# Patient Record
Sex: MALE | Race: WHITE | NOT HISPANIC OR LATINO
[De-identification: names, ages, dates, MRNs, and addresses within clinical notes are randomized per-mention and may not be internally consistent; named-entity substitution may affect disease eponyms.]

---

## 2017-01-10 ENCOUNTER — TRANSCRIPTION ENCOUNTER (OUTPATIENT)
Age: 67
End: 2017-01-10

## 2017-10-04 ENCOUNTER — TRANSCRIPTION ENCOUNTER (OUTPATIENT)
Age: 67
End: 2017-10-04

## 2018-02-08 ENCOUNTER — TRANSCRIPTION ENCOUNTER (OUTPATIENT)
Age: 68
End: 2018-02-08

## 2018-05-09 ENCOUNTER — TRANSCRIPTION ENCOUNTER (OUTPATIENT)
Age: 68
End: 2018-05-09

## 2019-08-23 ENCOUNTER — APPOINTMENT (OUTPATIENT)
Dept: ORTHOPEDIC SURGERY | Facility: CLINIC | Age: 69
End: 2019-08-23
Payer: MEDICARE

## 2019-08-23 VITALS
BODY MASS INDEX: 28.88 KG/M2 | DIASTOLIC BLOOD PRESSURE: 80 MMHG | HEIGHT: 69 IN | SYSTOLIC BLOOD PRESSURE: 153 MMHG | HEART RATE: 69 BPM | WEIGHT: 195 LBS

## 2019-08-23 DIAGNOSIS — M70.71 OTHER BURSITIS OF HIP, RIGHT HIP: ICD-10-CM

## 2019-08-23 DIAGNOSIS — M23.91 UNSPECIFIED INTERNAL DERANGEMENT OF RIGHT KNEE: ICD-10-CM

## 2019-08-23 PROCEDURE — 73522 X-RAY EXAM HIPS BI 3-4 VIEWS: CPT

## 2019-08-23 PROCEDURE — 99204 OFFICE O/P NEW MOD 45 MIN: CPT | Mod: 25

## 2019-08-23 PROCEDURE — 73564 X-RAY EXAM KNEE 4 OR MORE: CPT | Mod: RT

## 2019-08-23 PROCEDURE — 20610 DRAIN/INJ JOINT/BURSA W/O US: CPT | Mod: RT

## 2019-08-23 NOTE — PROCEDURE
[de-identified] : Procedure Note:\par \par Anatomic Location:  Right  Knee\par \par Diagnosis:  Arthritis\par \par Procedure:  Injection of 2cc  of Marcaine 0.25% plain and Celestone 1cc, 6mg\par \par Local Spray: Ethyl Chloride.\par \par \par Patient has consented for the procedure.\par \par Injection  through a lateral parapatella approach.\par \par Patient tolerated the procedure well.\par \par Patient instructed to call the office if any reaction, fever, chills, increased erythema or swelling.   [phone].

## 2019-08-23 NOTE — PHYSICAL EXAM
[de-identified] : Constitutional - the patient is of normal build and nutrition.  The patient remains oriented to person, time, and  place.  Mood is normal. Vital signs as recorded.  The patients gait is WNL.  Does however have pain when walking and bending over the medial compartment of his right knee.  He says he has had problems in the past with his back and there was question whether or not he had bursitis around his right hip.  The patient has satisfactory  balance and can stand on toes and heels.\par \par The patient has no difficulty with respiration. Respiration at rest is a normal rate. The patient is not short of breath and has not become short of breath with short ambulation. There is no audible wheezing. No coughing.\par \par Skin is normal for the patient's age. There are no abnormal masses or lymph nodes which stand out in the lower extremities.\par \par Spine - deep tendon reflexes are symmetric\par \par \par UPPER EXTREMITIES \par \par Shoulders ROM  is symmetric  and the motion is satisfactory.  There is no significant shoulder pain or limitation in motion which would make using a cane or a walker difficult. Shoulder stability and  strength are satisfactory.\par \par Circulation appears satisfactory with pedal pulses present.  There is no major edema in the lower legs. No skin tenderness or increased temperature. No major varicosities.\par \par HIP EXAMINATION the abduction and abduction as well as rotation measurements were taken with the hip in flexion.\par \par Motion\par At this time the patient does move his slight hip slightly less than he is moving his left hip which may be as normal where he may be protecting his knee but he has flexion right hip 130 left 135, abduction right hip 70 left hip 85, adduction right hip 30 left hip 30, external rotation right hip of 50 left hip 75, internal rotation right hip 20 left hip 20.  He does have no major discomfort with motion in his hip his discomfort is more over the medial compartment of his knee.  There is no crepitus in either hip and his hips are aligned normally.\par \par Is not severe and he says he would prefer not having a shot if not necessary as far as his hip.\par \par KNEE EXAMINATION\par \par Motion\par Right Knee                 *[0-125] he has good medial lateral and anterior posterior stability.  He does have an effusion in his knee but no Baker's cyst.  He has positive medial Steinmann test and pain directly over his medial joint line.  He does not have a major amount of pain with compression of his patella it appears to be more medial and it was acute.\par Left  Knee                  *[0-135] is good medial lateral and anterior posterior stability he has no effusion he has no major crepitus side he has no pain over the medial or lateral joint line.\par \par Ankle and foot examination\par Of the ankle has normal motion.  There is normal ankle stability.  The patient has no major abnormalities of the foot.\par \par \par \par  [de-identified] : Of 4 views of the right knee and AP of the left show normal joint space medially the patella tracks well there is no evidence of any significant arthritis by x-ray this does not rule out meniscal tears.  X-rays taken of the patient's pelvis and well as right and left hips show femoral heads are round joint space are maintained bilaterally there is no significant arthritis no major calcification over the greater trochanters this does not rule out some mild trochanteric bursitis.

## 2019-08-23 NOTE — HISTORY OF PRESENT ILLNESS
[de-identified] : Mr. JUDY BISHOP is a 68 year male who presents to office complaining of acute right knee pain.\par Patient was at the [employer] game last night and was running down the stairs during a rain delay, didn't feel pain initially, but awoke this morning with a sharp pain located medially.\miles Has h/o DDD in lumbar spine which had been affecting his right hip and taking Celebrex for this.\par Pain is constant and nonradiating.\par Level of pain on scale of 1-10 is 5 out of 10 at rest and 10/10 with ambulation.\par Denies buckling, locking, numbness, tingling, etc.\par All review of systems not previously stated as positive are negative.

## 2019-08-23 NOTE — DISCUSSION/SUMMARY
[de-identified] : Time he is doing satisfactory as far as his hips his right knee does give him the pain he tolerated a local injection very well and his pain was diminished if his pain does persist he is given a prescription for an MRI to better evaluate him for internal derangement or meniscal tear.  Return visit in 3 months or sooner if necessary.

## 2019-09-04 ENCOUNTER — FORM ENCOUNTER (OUTPATIENT)
Age: 69
End: 2019-09-04

## 2019-09-05 ENCOUNTER — APPOINTMENT (OUTPATIENT)
Dept: MRI IMAGING | Facility: CLINIC | Age: 69
End: 2019-09-05
Payer: MEDICARE

## 2019-09-05 ENCOUNTER — OUTPATIENT (OUTPATIENT)
Dept: OUTPATIENT SERVICES | Facility: HOSPITAL | Age: 69
LOS: 1 days | End: 2019-09-05
Payer: MEDICARE

## 2019-09-05 DIAGNOSIS — Z00.8 ENCOUNTER FOR OTHER GENERAL EXAMINATION: ICD-10-CM

## 2019-09-05 PROCEDURE — 73721 MRI JNT OF LWR EXTRE W/O DYE: CPT | Mod: 26,RT

## 2019-09-05 PROCEDURE — 73721 MRI JNT OF LWR EXTRE W/O DYE: CPT

## 2019-09-06 ENCOUNTER — TRANSCRIPTION ENCOUNTER (OUTPATIENT)
Age: 69
End: 2019-09-06

## 2019-09-11 ENCOUNTER — APPOINTMENT (OUTPATIENT)
Dept: ORTHOPEDIC SURGERY | Facility: CLINIC | Age: 69
End: 2019-09-11
Payer: MEDICARE

## 2019-09-11 VITALS
HEIGHT: 69 IN | HEART RATE: 72 BPM | SYSTOLIC BLOOD PRESSURE: 148 MMHG | WEIGHT: 195 LBS | DIASTOLIC BLOOD PRESSURE: 91 MMHG | BODY MASS INDEX: 28.88 KG/M2

## 2019-09-11 DIAGNOSIS — S83.249A OTHER TEAR OF MEDIAL MENISCUS, CURRENT INJURY, UNSPECIFIED KNEE, INITIAL ENCOUNTER: ICD-10-CM

## 2019-09-11 PROCEDURE — 99213 OFFICE O/P EST LOW 20 MIN: CPT

## 2019-09-11 NOTE — PHYSICAL EXAM
[de-identified] : \par KNEE EXAMINATION\par \par Motion\par Right Knee      still has a range of motion going from 0 to 125 degrees a good medial lateral knee posterior stability.  He has pain along the medial joint line and pain at the medial tibial plateau but it is much less than half of what it was 2 weeks ago at this time there is a minimal effusion in his knee and no Baker's cyst.  He is discomfort is steadily improving.  He has had no locking or giving way.\par \par            [de-identified] : Patient had and did show new acute medial meniscal tear around the periphery and posterior portion of the medial meniscus and a bone contusion of the medial tibial plateau which was mild.

## 2019-09-11 NOTE — DISCUSSION/SUMMARY
[de-identified] : Patient has a medial meniscal tear as well as a contusion of his medial tibial plateau very peripherally.  He is given a light open patella hinged knee support which he felt may give him a little more stability when he walks or a feeling of security.  He will stay on conservative measures at this time he is steadily improving he will see 1 of our sport orthopedist in 1 month if he is still having any significant problem.

## 2019-09-11 NOTE — HISTORY OF PRESENT ILLNESS
[de-identified] : Patient returns with right knee pain.\par His pain is slightly improved following cortisone injection in office on 8/23/19, but still has notable pain in his knee.\par He returns with MRI results of right knee DOS 9/5/19 showing tear of the body/posterior horn of the medial meniscus with focal edema in the adjacent medial tibial plateau. Focal full-thickness chondral fissuring within the superior pole of the median ridge with subchondral edema. Small joint effusion and small multiloculated Baker's cyst. \par He would like to discuss his options such as prescribed knee brace and/or PT vs. arthroscopic surgery, at this point.

## 2019-11-05 ENCOUNTER — APPOINTMENT (OUTPATIENT)
Dept: ORTHOPEDIC SURGERY | Facility: CLINIC | Age: 69
End: 2019-11-05
Payer: MEDICARE

## 2019-11-05 DIAGNOSIS — S83.241S OTHER TEAR OF MEDIAL MENISCUS, CURRENT INJURY, RIGHT KNEE, SEQUELA: ICD-10-CM

## 2019-11-05 PROCEDURE — 99213 OFFICE O/P EST LOW 20 MIN: CPT

## 2019-11-12 NOTE — DISCUSSION/SUMMARY
[Surgical risks reviewed] : Surgical risks reviewed [de-identified] : 68 y/o male presents with right knee pain due to meniscus tear. This time this patient does have a symptomatic tear of his medial meniscus. A lengthy discussion was held regarding the patients condition and treatment options including all risks, benefits, prognosis and outcomes of each were discussed in detail. The patient would like to continue to proceed with surgery since he has failed conservative therapy.  The patient will contact my surgical scheduler if he wished to proceed. The patient expressed understanding and all questions were answered. \par \par \par

## 2019-11-12 NOTE — PHYSICAL EXAM
[de-identified] : X-rays were reviewed by me today. 4 vies of the right knee dated on \par \par MRI of the right knee dated on 9/5/2019 reveals a tear of the  posterior aspect of his medial meniscus with minimal arthrotic chNGED..MM Full report of his MRI is given in the chart.8/23/19 Reveals minimal arthritic changes [de-identified] : 68 y/o pleasant male in NAD and AAOx3. Normal BMI. The pt has a mild antalgic gait. Physical examination of both knees reveals normal contours, skin intact with no signs of infection, no erythema, no swelling, no effusion, no distal lymphedema or phlebitis, no patholaxity. ROM of the right knee reveals 0-125 degrees. Strength is 5/5 within this arc of motion. There is pain on palpation to the medial joint line. There are no neurological deficits.

## 2019-11-12 NOTE — PHYSICAL EXAM
[de-identified] : 68 y/o pleasant male in NAD and AAOx3. Normal BMI. The pt has a mild antalgic gait. Physical examination of both knees reveals normal contours, skin intact with no signs of infection, no erythema, no swelling, no effusion, no distal lymphedema or phlebitis, no patholaxity. ROM of the right knee reveals 0-125 degrees. Strength is 5/5 within this arc of motion. There is pain on palpation to the medial joint line. There are no neurological deficits.  [de-identified] : X-rays were reviewed by me today. 4 vies of the right knee dated on \par \par MRI of the right knee dated on 9/5/2019 reveals a tear of the  posterior aspect of his medial meniscus with minimal arthrotic chNGED..MM Full report of his MRI is given in the chart.8/23/19 Reveals minimal arthritic changes

## 2019-11-12 NOTE — REASON FOR VISIT
[Follow-Up Visit] : a follow-up visit for [Initial Visit] : an initial visit for [FreeTextEntry2] : Right knee pain

## 2019-11-12 NOTE — DISCUSSION/SUMMARY
[Surgical risks reviewed] : Surgical risks reviewed [de-identified] : 68 y/o male presents with right knee pain due to meniscus tear. This time this patient does have a symptomatic tear of his medial meniscus. A lengthy discussion was held regarding the patients condition and treatment options including all risks, benefits, prognosis and outcomes of each were discussed in detail. The patient would like to continue to proceed with surgery since he has failed conservative therapy.  The patient will contact my surgical scheduler if he wished to proceed. The patient expressed understanding and all questions were answered. \par \par \par

## 2019-11-12 NOTE — HISTORY OF PRESENT ILLNESS
[de-identified] : 70 y/o male who was seen by Dr. Whitaker for right knee pain secondary to meniscus tear presents for consultation. He recently obtained and MRI and the patient is here for consultation. His knee pain has gotten worse over the past week require him to go to Urgent care and was treated with a medrol dose pack. He has tried cortisone injection which has not helped with his symptoms. \par

## 2019-11-12 NOTE — HISTORY OF PRESENT ILLNESS
[de-identified] : 70 y/o male who was seen by Dr. Whitaker for right knee pain secondary to meniscus tear presents for consultation. He recently obtained and MRI and the patient is here for consultation. His knee pain has gotten worse over the past week require him to go to Urgent care and was treated with a medrol dose pack. He has tried cortisone injection which has not helped with his symptoms. \par

## 2019-12-09 ENCOUNTER — OUTPATIENT (OUTPATIENT)
Dept: OUTPATIENT SERVICES | Facility: HOSPITAL | Age: 69
LOS: 1 days | End: 2019-12-09
Payer: MEDICARE

## 2019-12-09 VITALS
WEIGHT: 199.96 LBS | RESPIRATION RATE: 16 BRPM | DIASTOLIC BLOOD PRESSURE: 81 MMHG | OXYGEN SATURATION: 99 % | HEART RATE: 73 BPM | TEMPERATURE: 98 F | HEIGHT: 69 IN | SYSTOLIC BLOOD PRESSURE: 131 MMHG

## 2019-12-09 DIAGNOSIS — S83.241S OTHER TEAR OF MEDIAL MENISCUS, CURRENT INJURY, RIGHT KNEE, SEQUELA: ICD-10-CM

## 2019-12-09 DIAGNOSIS — S83.8X1A SPRAIN OF OTHER SPECIFIED PARTS OF RIGHT KNEE, INITIAL ENCOUNTER: ICD-10-CM

## 2019-12-09 DIAGNOSIS — K21.9 GASTRO-ESOPHAGEAL REFLUX DISEASE WITHOUT ESOPHAGITIS: ICD-10-CM

## 2019-12-09 DIAGNOSIS — I10 ESSENTIAL (PRIMARY) HYPERTENSION: ICD-10-CM

## 2019-12-09 DIAGNOSIS — K40.90 UNILATERAL INGUINAL HERNIA, WITHOUT OBSTRUCTION OR GANGRENE, NOT SPECIFIED AS RECURRENT: Chronic | ICD-10-CM

## 2019-12-09 DIAGNOSIS — Z01.818 ENCOUNTER FOR OTHER PREPROCEDURAL EXAMINATION: ICD-10-CM

## 2019-12-09 PROCEDURE — G0463: CPT

## 2019-12-09 RX ORDER — ROSUVASTATIN CALCIUM 5 MG/1
1 TABLET ORAL
Qty: 0 | Refills: 0 | DISCHARGE

## 2019-12-09 RX ORDER — LISINOPRIL 2.5 MG/1
1 TABLET ORAL
Qty: 0 | Refills: 0 | DISCHARGE

## 2019-12-09 RX ORDER — ESOMEPRAZOLE MAGNESIUM 40 MG/1
1 CAPSULE, DELAYED RELEASE ORAL
Qty: 0 | Refills: 0 | DISCHARGE

## 2019-12-09 RX ORDER — SERTRALINE 25 MG/1
3 TABLET, FILM COATED ORAL
Qty: 0 | Refills: 0 | DISCHARGE

## 2019-12-09 NOTE — H&P PST ADULT - NSICDXPROBLEM_GEN_ALL_CORE_FT
PROBLEM DIAGNOSES  Problem: Acute meniscal injury of right knee  Assessment and Plan: planned for right knee arthroscopy medial meniscectomy on 12/23/19.   will obtain recent labs from PCP  preprocedure surgical scrub instructions discussed    Problem: GERD (gastroesophageal reflux disease)  Assessment and Plan: nexium am of procedure     Problem: Hypertension  Assessment and Plan: lisnopril am of procedure

## 2019-12-09 NOTE — H&P PST ADULT - NSICDXPASTMEDICALHX_GEN_ALL_CORE_FT
PAST MEDICAL HISTORY:  CAD (coronary artery disease)     GERD (gastroesophageal reflux disease)     H/O hiatal hernia PAST MEDICAL HISTORY:  Acute meniscal injury of right knee     CAD (coronary artery disease) minor, denies hx of stents or MI.    GERD (gastroesophageal reflux disease)     H/O hiatal hernia     Hypertension     Pneumonia, aspiration > 5 years ago post op emergency inguinal hernia repair

## 2019-12-09 NOTE — H&P PST ADULT - HISTORY OF PRESENT ILLNESS
69 year old male with PMH of HTN, GERD, HLD with complaints of right knee pain since 8/2019 planned for right knee arthroscopy medial meniscectomy on 12/23/19.

## 2019-12-09 NOTE — H&P PST ADULT - ANESTHESIA, PREVIOUS REACTION, PROFILE
2014 inguinal hernia post op aspiration PNA with ICU stay 2014 s/p emergency inguinal hernia repair,  post op aspiration PNA with ICU stay.

## 2019-12-09 NOTE — H&P PST ADULT - NSANTHOSAYNRD_GEN_A_CORE
No. JEANNETTE screening performed.  STOP BANG Legend: 0-2 = LOW Risk; 3-4 = INTERMEDIATE Risk; 5-8 = HIGH Risk

## 2019-12-23 ENCOUNTER — APPOINTMENT (OUTPATIENT)
Dept: ORTHOPEDIC SURGERY | Facility: HOSPITAL | Age: 69
End: 2019-12-23

## 2020-06-07 ENCOUNTER — TRANSCRIPTION ENCOUNTER (OUTPATIENT)
Age: 70
End: 2020-06-07

## 2020-11-12 NOTE — H&P PST ADULT - NS SC CAGE ALCOHOL ANNOYED YOU
Health Maintenance Due   Topic Date Due   • Lung Cancer Screening  11/22/2020       Patient is due for the topics as listed above           Clinic hours for [facility]:  Monday 7 am - 5 pm  Tuesday 7 am - 5 pm  Wednesday 7 am - 5 pm  Thursday 7 am - 5 pm  Friday 7 am - 4 pm      If you need a refill on your prescription please call your pharmacy and let them know. Please be proactive and call before your medication runs out. The pharmacy will then contact us for the refill. Please allow 24-48 hours for the refill to be processed.     If your physician has ordered additional laboratory or radiology testing as part of your ongoing plan of care, please allow 7-10 business days from the day of your lab draw or test for the results to be sent and reviewed by your provider. If your results are critical and require more immediate intervention, you will be contacted sooner. Your results will be conveyed to you via a phone call or letter.    You may be receiving a patient satisfaction survey in the mail. Please take the time to complete, as your feedback is very important to us. We strive to make your experience exceptional and your comments help us with that goal. We look forward to hearing from you.       no

## 2021-06-30 NOTE — H&P PST ADULT - ALLERGIC/IMMUNOLOGIC
Progress Notes by Christen Linares CNP at 3/15/2021  9:10 AM     Author: Christen Linares CNP Service: -- Author Type: Nurse Practitioner    Filed: 3/15/2021  9:42 AM Encounter Date: 3/15/2021 Status: Signed    : Christen Linares CNP (Nurse Practitioner)             Assessment/Recommendations   1.  Coronary artery disease: PCI on March 2, 2021 with 2 drug-eluting stents to LAD.  He is on dual antiplatelet therapy with aspirin and Brilinta.    He has mild pain in his right forearm.  He rates pain/discomfort at 2 out of 10.  He is taking Tylenol with relief. He denies any numbness or tingling in his hand.  Hand is warm and pink.  José's and reverse José's tests normal.  Minimal bruising of forearm.  I asked that he call the clinic if pain worsens.    Risk factor modification and lifestyle management topics were discussed including managing comorbidities, weight loss, heart healthy diet and exercise.  He is participating in cardiac rehab.    2.  Dyslipidemia: Taurus Cotto is on high intensity statin therapy with atorvastatin 80 mg daily and Zetia.  We discussed a diet low in saturated fat, weight loss, and exercise along with medication for better control of cholesterol.     3.  Hypertension: His blood pressure is well controlled today at 114/72.    4.  Obstructive sleep apnea: He is now wearing his CPAP nightly.    Alec will follow up with Dr. Albarran on March 29.       History of Present Illness/Subjective    Taurus Cotto is seen at [facility] for post coronary intervention follow up.  He has a history of coronary artery disease with PCI in 2017, hypertension, dyslipidemia, and obstructive sleep apnea.  He had increasing dyspnea on exertion with an abnormal stress test.  PCI on March 2, 2021 with 2 drug-eluting stents to LAD.  He is on dual antiplatelet therapy with aspirin and Brilinta.    He has chronic, mild chest pain for the past 20 years and is at baseline.   He has occasional shortness of breath with activity but feels that this is more likely due to being out of shape.  He is tolerating cardiac rehab but does get fatigued.  He denies any orthopnea.  He had an episode of dizziness yesterday after bending over.  He denies any syncope or near syncope.  He denies lower extremity edema.  He has mild pain in his right forearm.     Results for orders placed during the hospital encounter of 03/02/21   Cardiac Catheterization [CATH01] 03/02/2021    Narrative   Exertional dyspnea in a morbidly obese man with known coronary disease   by angiogram in 2017 and abnormal stress test and echocardiogram.    Short left main, grossly normal.    LAD has diffuse at least moderate mid vessel disease that was flow   limiting by FFR 0.74. Lesion stented with overlapping Synergy AUREA x 2.    Mild mid circumflex disease.    At least moderate disease in the proximal and mid RCA that was not flow   limiting by FFR (0.83). Distal RCA stent widely patent.    LV EDP elevated at 24 mmHg.    Estimated blood loss was <20 ml.           Physical Examination Review of Systems   Vitals:    03/15/21 0900   BP: 114/72   Pulse: 69   Resp: 16   SpO2: 95%     Body mass index is 35.01 kg/m .  Wt Readings from Last 3 Encounters:   03/15/21 (!) 244 lb (110.7 kg)   03/03/21 (!) 249 lb (112.9 kg)   02/23/21 (!) 248 lb (112.5 kg)       General Appearance:     Alert, cooperative and in no acute distress.   ENT/Mouth: membranes moist, no oral lesions or bleeding gums.      EYES:  no scleral icterus, normal conjunctivae   Chest/Lungs:   lungs are clear to auscultation, no rales or wheezing, respirations unlabored   Cardiovascular:   Regular. Normal first and second heart sounds, no edema bilateral lower extremities    Abdomen:  Soft, nontender, nondistended, bowel sounds present   Extremities: no cyanosis or clubbing   Skin:  Neurologic: warm, dry.  mood and affect are appropriate, alert and oriented x3     Puncture  Site: Right radial site is soft with mild bruising.  Radial pulses and Pedal pulses intact and symmetrical.  CMS intact.        General: WNL  Eyes: WNL  Ears/Nose/Throat: WNL  Lungs: WNL  Heart: WNL  Stomach: WNL  Bladder: WNL  Muscle/Joints: Muscle Pain  Skin: WNL  Nervous System: Dizziness  Mental Health: WNL     Blood: WNL     Medical History  Surgical History Family History Social History   Past Medical History:   Diagnosis Date   ? Cardiac murmur    ? Coronary artery disease    ? GERD (gastroesophageal reflux disease)    ? High cholesterol    ? Hyperlipidemia    ? Hypertension     Past Surgical History:   Procedure Laterality Date   ? CARDIAC CATHETERIZATION  07/31/2017    AUREA to distal RCA   ? CARDIAC CATHETERIZATION N/A 7/31/2017    Procedure: Coronary Angiogram;  Surgeon: Dandre Love MD;  Location: [facility];  Service:    ? CARPAL TUNNEL RELEASE Right    ? CORONARY STENT PLACEMENT     ? CV CORONARY ANGIOGRAM N/A 3/2/2021    Procedure: Coronary Angiogram;  Surgeon: Marivel Loo MD;  Location: [facility] Cardiac Cath Lab;  Service: Cardiology   ? CV LEFT HEART CATHETERIZATION WO LEFT VETRICULOGRAM Left 3/2/2021    Procedure: Left Heart Catheterization Without Left Ventriculogram;  Surgeon: Marivel Loo MD;  Location: [facility] Cardiac Cath Lab;  Service: Cardiology   ? FRACTURE SURGERY     ? HERNIA REPAIR     ? NV CATH PLMT L HRT & ARTS W/NJX & ANGIO IMG S&I Left 7/31/2017    Procedure: Left Heart Catheterization Without Left Ventriculogram;  Surgeon: Dandre Love MD;  Location: [facility] Cath Lab;  Service: Cardiology    No family history on file. Social History     Socioeconomic History   ? Marital status:      Spouse name: Not on file   ? Number of children: Not on file   ? Years of education: Not on file   ? Highest education level: Not on file   Occupational History   ? Not on file   Social Needs   ? Financial resource strain: Not on file   ? Food insecurity      Worry: Not on file     Inability: Not on file   ? Transportation needs     Medical: Not on file     Non-medical: Not on file   Tobacco Use   ? Smoking status: Former Smoker     Types: Cigarettes, Cigars   ? Smokeless tobacco: Never Used   ? Tobacco comment: still occasionally smokes cigars   Substance and Sexual Activity   ? Alcohol use: No   ? Drug use: No   ? Sexual activity: Not on file   Lifestyle   ? Physical activity     Days per week: Not on file     Minutes per session: Not on file   ? Stress: Not on file   Relationships   ? Social connections     Talks on phone: Not on file     Gets together: Not on file     Attends [employer] service: Not on file     Active member of club or organization: Not on file     Attends meetings of clubs or organizations: Not on file     Relationship status: Not on file   ? Intimate partner violence     Fear of current or ex partner: Not on file     Emotionally abused: Not on file     Physically abused: Not on file     Forced sexual activity: Not on file   Other Topics Concern   ? Not on file   Social History Narrative   ? Not on file          Medications  Allergies   Current Outpatient Medications   Medication Sig Dispense Refill   ? aspirin 81 mg chewable tablet Chew 1 tablet (81 mg total) daily. (Patient taking differently: Chew 81 mg at bedtime. )  0   ? atorvastatin (LIPITOR) 80 MG tablet Take 1 tablet (80 mg total) by mouth at bedtime. 90 tablet 3   ? buPROPion (WELLBUTRIN SR) 100 MG 12 hr tablet Take 100 mg by mouth 2 (two) times a day.            ? ezetimibe (ZETIA) 10 mg tablet Take 1 tablet (10 mg total) by mouth daily. 90 tablet 3   ? melatonin 10 mg Tab Take 10 mg by mouth at bedtime.     ? metoprolol succinate (TOPROL-XL) 25 MG Take 1 tablet (25 mg total) by mouth daily. 90 tablet 3   ? omeprazole (PRILOSEC) 20 MG capsule Take 1 capsule (20 mg total) by mouth at bedtime. 30 capsule 0   ? polyvinyl alcohol/povidone (CLEAR EYES NATURAL TEARS OPHT) Apply 2 drops to  eye 3 (three) times a day as needed.     ? ranolazine (RANEXA) 1,000 mg SR tablet Take 1 tablet (1,000 mg total) by mouth 2 (two) times a day. 60 tablet 0   ? sodium chloride (OCEAN) 0.65 % nasal spray Apply 2 sprays into each nostril as needed for congestion.     ? ticagrelor (BRILINTA) 90 mg Tab Take 1 tablet (90 mg total) by mouth 2 (two) times a day. Dose to start tomorrow morning 180 tablet 3     No current facility-administered medications for this visit.     Allergies   Allergen Reactions   ? Lisinopril Cough         Lab Results    Chemistry/lipid CBC Cardiac Enzymes/BNP/TSH/INR   Lab Results   Component Value Date    CHOL 135 03/02/2021    HDL 36 (L) 03/02/2021    LDLCALC 79 03/02/2021    TRIG 102 03/02/2021    CREATININE 0.94 03/03/2021    BUN 21 03/03/2021    K 3.9 03/03/2021     03/03/2021     (H) 03/03/2021    CO2 23 03/03/2021    Lab Results   Component Value Date    WBC 8.1 03/03/2021    HGB 12.8 (L) 03/03/2021    HCT 38.4 (L) 03/03/2021    MCV 89 03/03/2021     03/03/2021    Lab Results   Component Value Date    TROPONINI <0.01 06/17/2019    BNP 21 06/16/2019    TSH 2.64 06/14/2018    INR 1.00 07/29/2017                                                   negative

## 2021-09-30 PROBLEM — I10 ESSENTIAL (PRIMARY) HYPERTENSION: Chronic | Status: ACTIVE | Noted: 2019-12-09

## 2021-09-30 PROBLEM — S83.8X1A SPRAIN OF OTHER SPECIFIED PARTS OF RIGHT KNEE, INITIAL ENCOUNTER: Chronic | Status: ACTIVE | Noted: 2019-12-09

## 2021-09-30 PROBLEM — J69.0 PNEUMONITIS DUE TO INHALATION OF FOOD AND VOMIT: Chronic | Status: ACTIVE | Noted: 2019-12-09

## 2021-09-30 PROBLEM — Z87.19 PERSONAL HISTORY OF OTHER DISEASES OF THE DIGESTIVE SYSTEM: Chronic | Status: ACTIVE | Noted: 2019-12-09

## 2021-10-13 ENCOUNTER — OUTPATIENT (OUTPATIENT)
Dept: OUTPATIENT SERVICES | Facility: HOSPITAL | Age: 71
LOS: 1 days | End: 2021-10-13

## 2021-10-13 ENCOUNTER — APPOINTMENT (OUTPATIENT)
Dept: CT IMAGING | Facility: CLINIC | Age: 71
End: 2021-10-13
Payer: SELF-PAY

## 2021-10-13 ENCOUNTER — APPOINTMENT (OUTPATIENT)
Dept: CT IMAGING | Facility: CLINIC | Age: 71
End: 2021-10-13
Payer: MEDICARE

## 2021-10-13 ENCOUNTER — APPOINTMENT (OUTPATIENT)
Dept: ULTRASOUND IMAGING | Facility: CLINIC | Age: 71
End: 2021-10-13
Payer: MEDICARE

## 2021-10-13 ENCOUNTER — OUTPATIENT (OUTPATIENT)
Dept: OUTPATIENT SERVICES | Facility: HOSPITAL | Age: 71
LOS: 1 days | End: 2021-10-13
Payer: MEDICARE

## 2021-10-13 DIAGNOSIS — Z00.8 ENCOUNTER FOR OTHER GENERAL EXAMINATION: ICD-10-CM

## 2021-10-13 DIAGNOSIS — K40.90 UNILATERAL INGUINAL HERNIA, WITHOUT OBSTRUCTION OR GANGRENE, NOT SPECIFIED AS RECURRENT: Chronic | ICD-10-CM

## 2021-10-13 PROCEDURE — 75571 CT HRT W/O DYE W/CA TEST: CPT | Mod: 26,MH

## 2021-10-13 PROCEDURE — 75571 CT HRT W/O DYE W/CA TEST: CPT | Mod: MH

## 2021-10-13 PROCEDURE — 76536 US EXAM OF HEAD AND NECK: CPT | Mod: 26

## 2021-10-13 PROCEDURE — 76536 US EXAM OF HEAD AND NECK: CPT

## 2022-07-04 ENCOUNTER — NON-APPOINTMENT (OUTPATIENT)
Age: 72
End: 2022-07-04

## 2024-10-29 NOTE — H&P PST ADULT - CARDIOVASCULAR
Chief Complaint   Patient presents with    Weakness        Patient Bayron is 70 year old male presents to the ER for \"jerking\" movements and increased generalized weakness x1 weeks. Per family pt has a hx of dementia at baseline is oriented to self, currently a&ox1. Has not been reporting any pain. Pt has had two witnessed falls in the bedroom onto his bottom. No recent hitting his head or trauma. Family reports reaching out to multiple physicians but has been advised \"come to the ER because it isn't his medication.\"      Patient has a past medical history of Alcohol abuse, episodic (03/02/2016), Colon cancer screening (04/16/2019), Dementia (CMD), Essential hypertension (04/16/2019), Health care maintenance (04/16/2019), History of anemia (04/16/2019), Hyperlipidemia (04/16/2019), Memory loss (03/02/2016), and Mild cognitive impairment (02/20/2017).    Patient has no past surgical history on file.         negative Regular rate & rhythm, normal S1, S2; no murmurs, gallops or rubs; no S3, S4

## 2025-02-07 ENCOUNTER — NON-APPOINTMENT (OUTPATIENT)
Age: 75
End: 2025-02-07

## 2025-02-26 ENCOUNTER — NON-APPOINTMENT (OUTPATIENT)
Age: 75
End: 2025-02-26

## 2025-05-28 ENCOUNTER — APPOINTMENT (OUTPATIENT)
Dept: UROLOGY | Facility: CLINIC | Age: 75
End: 2025-05-28
Payer: MEDICARE

## 2025-05-28 VITALS
WEIGHT: 190 LBS | RESPIRATION RATE: 16 BRPM | HEART RATE: 70 BPM | DIASTOLIC BLOOD PRESSURE: 78 MMHG | OXYGEN SATURATION: 97 % | TEMPERATURE: 98 F | BODY MASS INDEX: 28.14 KG/M2 | HEIGHT: 69 IN | SYSTOLIC BLOOD PRESSURE: 144 MMHG

## 2025-05-28 DIAGNOSIS — N52.9 MALE ERECTILE DYSFUNCTION, UNSPECIFIED: ICD-10-CM

## 2025-05-28 DIAGNOSIS — N40.1 BENIGN PROSTATIC HYPERPLASIA WITH LOWER URINARY TRACT SYMPMS: ICD-10-CM

## 2025-05-28 DIAGNOSIS — N13.8 BENIGN PROSTATIC HYPERPLASIA WITH LOWER URINARY TRACT SYMPMS: ICD-10-CM

## 2025-05-28 PROBLEM — Z12.5 PROSTATE CANCER SCREENING: Status: ACTIVE | Noted: 2025-05-28

## 2025-05-28 PROCEDURE — 99204 OFFICE O/P NEW MOD 45 MIN: CPT

## 2025-05-28 RX ORDER — TADALAFIL 5 MG/1
5 TABLET ORAL
Qty: 30 | Refills: 2 | Status: ACTIVE | COMMUNITY
Start: 2025-05-28 | End: 1900-01-01

## 2025-05-28 RX ORDER — SILDENAFIL 100 MG/1
100 TABLET, FILM COATED ORAL
Refills: 0 | Status: DISCONTINUED | COMMUNITY

## 2025-05-28 RX ORDER — SERTRALINE HYDROCHLORIDE 100 MG/1
100 TABLET, FILM COATED ORAL
Refills: 0 | Status: ACTIVE | COMMUNITY

## 2025-05-28 RX ORDER — AMLODIPINE BESYLATE 10 MG/1
10 TABLET ORAL
Refills: 0 | Status: ACTIVE | COMMUNITY

## 2025-06-04 ENCOUNTER — NON-APPOINTMENT (OUTPATIENT)
Age: 75
End: 2025-06-04

## 2025-06-04 LAB
APPEARANCE: CLEAR
BACTERIA: NEGATIVE /HPF
BILIRUBIN URINE: NEGATIVE
BLOOD URINE: NEGATIVE
CAST: 2 /LPF
COLOR: YELLOW
EPITHELIAL CELLS: 1 /HPF
GLUCOSE QUALITATIVE U: NEGATIVE MG/DL
KETONES URINE: NEGATIVE MG/DL
LEUKOCYTE ESTERASE URINE: ABNORMAL
MICROSCOPIC-UA: NORMAL
NITRITE URINE: NEGATIVE
PH URINE: 5.5
PROTEIN URINE: 30 MG/DL
RED BLOOD CELLS URINE: 0 /HPF
SPECIFIC GRAVITY URINE: 1.02
UROBILINOGEN URINE: 1 MG/DL
WHITE BLOOD CELLS URINE: 0 /HPF

## 2025-06-06 ENCOUNTER — NON-APPOINTMENT (OUTPATIENT)
Age: 75
End: 2025-06-06

## 2025-06-06 LAB
PSA FREE FLD-MCNC: 24 %
PSA FREE SERPL-MCNC: 0.99 NG/ML
PSA SERPL-MCNC: 4.2 NG/ML

## 2025-06-07 LAB
PSA FREE FLD-MCNC: 23 %
PSA FREE SERPL-MCNC: 0.86 NG/ML
PSA SERPL-MCNC: 3.67 NG/ML

## 2025-07-01 ENCOUNTER — APPOINTMENT (OUTPATIENT)
Dept: UROLOGY | Facility: CLINIC | Age: 75
End: 2025-07-01
Payer: MEDICARE

## 2025-07-01 PROCEDURE — 99214 OFFICE O/P EST MOD 30 MIN: CPT

## 2025-07-01 RX ORDER — TADALAFIL 20 MG/1
20 TABLET ORAL
Qty: 10 | Refills: 0 | Status: ACTIVE | COMMUNITY
Start: 2025-07-01 | End: 1900-01-01